# Patient Record
Sex: MALE | Race: WHITE | NOT HISPANIC OR LATINO | URBAN - METROPOLITAN AREA
[De-identification: names, ages, dates, MRNs, and addresses within clinical notes are randomized per-mention and may not be internally consistent; named-entity substitution may affect disease eponyms.]

---

## 2017-10-24 ENCOUNTER — APPOINTMENT (RX ONLY)
Dept: URBAN - METROPOLITAN AREA CLINIC 371 | Facility: CLINIC | Age: 22
Setting detail: DERMATOLOGY
End: 2017-10-24

## 2017-10-24 DIAGNOSIS — L70.0 ACNE VULGARIS: ICD-10-CM

## 2017-10-24 PROCEDURE — ? PRESCRIPTION

## 2017-10-24 PROCEDURE — ? TREATMENT REGIMEN

## 2017-10-24 PROCEDURE — 99202 OFFICE O/P NEW SF 15 MIN: CPT

## 2017-10-24 PROCEDURE — ? COUNSELING

## 2017-10-24 RX ORDER — DOXYCYCLINE HYCLATE 50 MG/1
TABLET ORAL
Qty: 60 | Refills: 2 | Status: ERX | COMMUNITY
Start: 2017-10-24

## 2017-10-24 RX ORDER — ADAPALENE AND BENZOYL PEROXIDE 3; 25 MG/G; MG/G
GEL TOPICAL
Qty: 1 | Refills: 2 | Status: ERX | COMMUNITY
Start: 2017-10-24

## 2017-10-24 RX ORDER — DAPSONE 75 MG/G
GEL TOPICAL
Qty: 1 | Refills: 3 | Status: ERX | COMMUNITY
Start: 2017-10-24

## 2017-10-24 RX ADMIN — DAPSONE: 75 GEL TOPICAL at 14:47

## 2017-10-24 RX ADMIN — ADAPALENE AND BENZOYL PEROXIDE: 3; 25 GEL TOPICAL at 14:47

## 2017-10-24 RX ADMIN — DOXYCYCLINE HYCLATE: 50 TABLET ORAL at 14:47

## 2017-10-24 ASSESSMENT — LOCATION SIMPLE DESCRIPTION DERM
LOCATION SIMPLE: RIGHT CHEEK
LOCATION SIMPLE: NOSE
LOCATION SIMPLE: CHIN
LOCATION SIMPLE: LEFT CHEEK

## 2017-10-24 ASSESSMENT — LOCATION DETAILED DESCRIPTION DERM
LOCATION DETAILED: NASAL DORSUM
LOCATION DETAILED: LEFT CHIN
LOCATION DETAILED: LEFT INFERIOR CENTRAL MALAR CHEEK
LOCATION DETAILED: RIGHT INFERIOR CENTRAL MALAR CHEEK

## 2017-10-24 ASSESSMENT — LOCATION ZONE DERM
LOCATION ZONE: NOSE
LOCATION ZONE: FACE

## 2017-10-24 NOTE — PROCEDURE: TREATMENT REGIMEN
Initiate Treatment: AM\\n1.  Wash your face with the Benzaderm Wash\\n2.  Apply the Aczone 7.5% gel to your face\\n3.  Apply an oil free moisturizer such as Cetaphil Dermacontrol with SPF\\n\\nPM\\n1.  Wash your face with the Benzaderm Wash\\n2.  Apply a thin layer of Epiduo Forte gel to your face\\n3.  Apply an oil free moisturizer as needed for dryness\\n4.  Take two Targadox tablets once daily
Detail Level: Zone

## 2017-10-24 NOTE — HPI: PIMPLES (ACNE)
How Severe Is Your Acne?: moderate
Is This A New Presentation, Or A Follow-Up?: Acne
Additional Comments (Use Complete Sentences): Patient uses a neutrogena Benzoyl peroxide cleanser twice a day.

## 2017-12-05 ENCOUNTER — APPOINTMENT (RX ONLY)
Dept: URBAN - METROPOLITAN AREA CLINIC 371 | Facility: CLINIC | Age: 22
Setting detail: DERMATOLOGY
End: 2017-12-05

## 2017-12-05 ENCOUNTER — RX ONLY (OUTPATIENT)
Age: 22
Setting detail: RX ONLY
End: 2017-12-05

## 2017-12-05 DIAGNOSIS — L70.0 ACNE VULGARIS: ICD-10-CM | Status: IMPROVED

## 2017-12-05 PROCEDURE — ? TREATMENT REGIMEN

## 2017-12-05 PROCEDURE — ? COUNSELING

## 2017-12-05 PROCEDURE — 99213 OFFICE O/P EST LOW 20 MIN: CPT

## 2017-12-05 RX ORDER — CLINDAMYCIN PHOSPHATE AND BENZOYL PEROXIDE 10; 37.5 MG/G; MG/G
GEL TOPICAL
Qty: 1 | Refills: 3 | Status: ERX | COMMUNITY
Start: 2017-12-05

## 2017-12-05 ASSESSMENT — LOCATION SIMPLE DESCRIPTION DERM
LOCATION SIMPLE: CHIN
LOCATION SIMPLE: LEFT CHEEK
LOCATION SIMPLE: NOSE
LOCATION SIMPLE: RIGHT CHEEK

## 2017-12-05 ASSESSMENT — LOCATION DETAILED DESCRIPTION DERM
LOCATION DETAILED: RIGHT INFERIOR CENTRAL MALAR CHEEK
LOCATION DETAILED: LEFT CHIN
LOCATION DETAILED: NASAL DORSUM
LOCATION DETAILED: LEFT INFERIOR CENTRAL MALAR CHEEK

## 2017-12-05 ASSESSMENT — LOCATION ZONE DERM
LOCATION ZONE: NOSE
LOCATION ZONE: FACE

## 2017-12-05 NOTE — PROCEDURE: TREATMENT REGIMEN
Detail Level: Zone
Discontinue Regimen: Aczone 7.5% gel
Modify Regimen: AM\\n1.  Wash your face with the Benzaderm Wash\\n2.  Apply a thin layer of Onexton gel to the entire face each morning. \\n3.  Apply an oil free moisturizer such as Cetaphil Dermacontrol with SPF\\n\\nPM\\n1.  Wash your face with the Benzaderm Wash\\n2.  Apply a thin layer of Epiduo Forte gel to your face\\n3.  Apply an oil free moisturizer as needed for dryness\\n4.  Take two Targadox tablets once daily

## 2018-01-09 ENCOUNTER — APPOINTMENT (RX ONLY)
Dept: URBAN - METROPOLITAN AREA CLINIC 371 | Facility: CLINIC | Age: 23
Setting detail: DERMATOLOGY
End: 2018-01-09

## 2018-01-09 DIAGNOSIS — L70.0 ACNE VULGARIS: ICD-10-CM | Status: IMPROVED

## 2018-01-09 PROCEDURE — 99213 OFFICE O/P EST LOW 20 MIN: CPT

## 2018-01-09 PROCEDURE — ? TREATMENT REGIMEN

## 2018-01-09 PROCEDURE — ? COUNSELING

## 2018-01-09 RX ORDER — DOXYCYCLINE HYCLATE 50 MG/1
TABLET ORAL
Qty: 60 | Refills: 2 | Status: ERX

## 2018-01-09 ASSESSMENT — LOCATION SIMPLE DESCRIPTION DERM
LOCATION SIMPLE: RIGHT CHEEK
LOCATION SIMPLE: LEFT CHEEK
LOCATION SIMPLE: NOSE
LOCATION SIMPLE: CHIN

## 2018-01-09 ASSESSMENT — LOCATION DETAILED DESCRIPTION DERM
LOCATION DETAILED: LEFT INFERIOR CENTRAL MALAR CHEEK
LOCATION DETAILED: RIGHT INFERIOR CENTRAL MALAR CHEEK
LOCATION DETAILED: LEFT CHIN
LOCATION DETAILED: NASAL DORSUM

## 2018-01-09 ASSESSMENT — LOCATION ZONE DERM
LOCATION ZONE: NOSE
LOCATION ZONE: FACE

## 2018-01-09 NOTE — PROCEDURE: TREATMENT REGIMEN
Continue Regimen: AM\\n1.  Wash your face with the Benzaderm Wash\\n2.  Apply a thin layer of Onexton gel to the entire face each morning. \\n3.  Apply an oil free moisturizer such as Cetaphil Dermacontrol with SPF\\n\\nPM\\n1.  Wash your face with the Benzaderm Wash\\n2.  Apply a thin layer of Epiduo Forte gel to your face\\n3.  Apply an oil free moisturizer as needed for dryness\\n4.  Take two Targadox tablets once daily x 1 more month
Detail Level: Zone

## 2018-03-06 ENCOUNTER — APPOINTMENT (RX ONLY)
Dept: URBAN - METROPOLITAN AREA CLINIC 371 | Facility: CLINIC | Age: 23
Setting detail: DERMATOLOGY
End: 2018-03-06

## 2018-03-06 DIAGNOSIS — L70.0 ACNE VULGARIS: ICD-10-CM | Status: IMPROVED

## 2018-03-06 PROCEDURE — ? COUNSELING

## 2018-03-06 PROCEDURE — ? TREATMENT REGIMEN

## 2018-03-06 PROCEDURE — 99213 OFFICE O/P EST LOW 20 MIN: CPT

## 2018-03-06 ASSESSMENT — LOCATION DETAILED DESCRIPTION DERM
LOCATION DETAILED: LEFT CHIN
LOCATION DETAILED: NASAL DORSUM
LOCATION DETAILED: RIGHT INFERIOR CENTRAL MALAR CHEEK
LOCATION DETAILED: LEFT INFERIOR CENTRAL MALAR CHEEK

## 2018-03-06 ASSESSMENT — LOCATION ZONE DERM
LOCATION ZONE: FACE
LOCATION ZONE: NOSE

## 2018-03-06 ASSESSMENT — LOCATION SIMPLE DESCRIPTION DERM
LOCATION SIMPLE: RIGHT CHEEK
LOCATION SIMPLE: LEFT CHEEK
LOCATION SIMPLE: CHIN
LOCATION SIMPLE: NOSE

## 2018-03-06 NOTE — PROCEDURE: TREATMENT REGIMEN
Discontinue Regimen: Targadox-patient finished treatment
Continue Regimen: AM\\n1.  Wash your face with the Benzaderm Wash\\n2.  Apply a thin layer of Onexton gel to the entire face each morning. \\n3.  Apply an oil free moisturizer such as Cetaphil Dermacontrol with SPF\\n\\nPM\\n1.  Wash your face with the Benzaderm Wash\\n2.  Apply a thin layer of Epiduo Forte gel to your face\\n3.  Apply an oil free moisturizer as needed for dryness\\n
Detail Level: Zone

## 2018-05-14 RX ORDER — ADAPALENE AND BENZOYL PEROXIDE 3; 25 MG/G; MG/G
GEL TOPICAL
Qty: 1 | Refills: 2 | Status: ERX

## 2018-06-12 ENCOUNTER — APPOINTMENT (RX ONLY)
Dept: URBAN - METROPOLITAN AREA CLINIC 371 | Facility: CLINIC | Age: 23
Setting detail: DERMATOLOGY
End: 2018-06-12

## 2018-06-12 DIAGNOSIS — L70.0 ACNE VULGARIS: ICD-10-CM

## 2018-06-12 PROCEDURE — 99213 OFFICE O/P EST LOW 20 MIN: CPT

## 2018-06-12 PROCEDURE — ? TREATMENT REGIMEN

## 2018-06-12 PROCEDURE — ? COUNSELING

## 2018-06-12 ASSESSMENT — LOCATION DETAILED DESCRIPTION DERM
LOCATION DETAILED: LEFT CHIN
LOCATION DETAILED: NASAL DORSUM
LOCATION DETAILED: LEFT INFERIOR CENTRAL MALAR CHEEK
LOCATION DETAILED: RIGHT INFERIOR CENTRAL MALAR CHEEK

## 2018-06-12 ASSESSMENT — LOCATION SIMPLE DESCRIPTION DERM
LOCATION SIMPLE: LEFT CHEEK
LOCATION SIMPLE: RIGHT CHEEK
LOCATION SIMPLE: NOSE
LOCATION SIMPLE: CHIN

## 2018-06-12 ASSESSMENT — LOCATION ZONE DERM
LOCATION ZONE: FACE
LOCATION ZONE: NOSE

## 2018-06-12 NOTE — PROCEDURE: TREATMENT REGIMEN
Detail Level: Zone
Continue Regimen: AM\\n1.  Wash your face with the Benzaderm Wash\\n2.  Apply a thin layer of Onexton gel to the entire face each morning or as spot treatment as needed for flares\\n3.  Apply an oil free moisturizer such as Cetaphil Dermacontrol with SPF\\n\\nPM\\n1.  Wash your face with the Benzaderm Wash\\n2.  Apply a thin layer of Epiduo Forte gel to your face\\n3.  Apply an oil free moisturizer as needed for dryness\\n

## 2018-11-02 ENCOUNTER — APPOINTMENT (RX ONLY)
Dept: URBAN - METROPOLITAN AREA CLINIC 371 | Facility: CLINIC | Age: 23
Setting detail: DERMATOLOGY
End: 2018-11-02

## 2018-11-02 DIAGNOSIS — L70.0 ACNE VULGARIS: ICD-10-CM | Status: WELL CONTROLLED

## 2018-11-02 PROCEDURE — ? TREATMENT REGIMEN

## 2018-11-02 PROCEDURE — ? PRESCRIPTION

## 2018-11-02 PROCEDURE — 99213 OFFICE O/P EST LOW 20 MIN: CPT

## 2018-11-02 PROCEDURE — ? COUNSELING

## 2018-11-02 RX ORDER — ADAPALENE AND BENZOYL PEROXIDE 3; 25 MG/G; MG/G
GEL TOPICAL
Qty: 1 | Refills: 3 | Status: ERX

## 2018-11-02 ASSESSMENT — LOCATION ZONE DERM
LOCATION ZONE: FACE
LOCATION ZONE: NOSE

## 2018-11-02 ASSESSMENT — LOCATION DETAILED DESCRIPTION DERM
LOCATION DETAILED: LEFT INFERIOR CENTRAL MALAR CHEEK
LOCATION DETAILED: LEFT CHIN
LOCATION DETAILED: NASAL DORSUM
LOCATION DETAILED: RIGHT INFERIOR CENTRAL MALAR CHEEK

## 2018-11-02 ASSESSMENT — LOCATION SIMPLE DESCRIPTION DERM
LOCATION SIMPLE: NOSE
LOCATION SIMPLE: CHIN
LOCATION SIMPLE: LEFT CHEEK
LOCATION SIMPLE: RIGHT CHEEK

## 2018-11-02 NOTE — PROCEDURE: TREATMENT REGIMEN
Continue Regimen: AM\\n1.  Wash your face with the Benzaderm Wash\\n2.  Apply an oil free moisturizer such as Cetaphil Dermacontrol with SPF\\n\\nPM\\n1.  Wash your face with the Benzaderm Wash\\n2.  Apply a thin layer of Epiduo Forte gel to your face\\n3.  Apply an oil free moisturizer as needed for dryness\\n
Detail Level: Zone